# Patient Record
Sex: MALE | Race: WHITE
[De-identification: names, ages, dates, MRNs, and addresses within clinical notes are randomized per-mention and may not be internally consistent; named-entity substitution may affect disease eponyms.]

---

## 2023-03-27 ENCOUNTER — HOSPITAL ENCOUNTER (OUTPATIENT)
Dept: HOSPITAL 75 - RAD | Age: 68
End: 2023-03-27
Attending: OTOLARYNGOLOGY
Payer: MEDICARE

## 2023-03-27 VITALS — WEIGHT: 203.49 LBS | BODY MASS INDEX: 29.13 KG/M2 | HEIGHT: 70 IN

## 2023-03-27 DIAGNOSIS — E04.2: Primary | ICD-10-CM

## 2023-03-27 PROCEDURE — 10006 FNA BX W/US GDN EA ADDL: CPT

## 2023-03-27 NOTE — DIAGNOSTIC IMAGING REPORT
INDICATION: Left lobe thyroid nodules. Patient presents for

ultrasound-guided fine-needle aspiration.



Patient was brought to the procedure room and placed on table in

the supine position. Ultrasound imaging of the left neck was

performed to evaluate appropriate entry site. Left neck was then

prepped and draped in the usual sterile fashion. A small amount

of 1% lidocaine was utilized for local anesthesia. Four passes

were made into the dominant solid mass in the mid portion of the

left lobe of the thyroid utilizing 25-gauge needles and

fine-needle aspiration technique. Needles were removed, and

hemostasis was obtained. Patient tolerated the procedure well and

left the department in stable condition.



IMPRESSION: Successful ultrasound-guided fine needle aspiration

of the dominant solid mass with calcifications in the mid portion

of the left lobe of the thyroid. Pathology results are currently

pending.



Dictated by: 



  Dictated on workstation # [id number]

## 2023-03-27 NOTE — DIAGNOSTIC IMAGING REPORT
INDICATION: Left lobe thyroid nodules. Patient presents for

ultrasound-guided fine-needle aspiration.



FINDINGS: Patient was brought to the procedure room, placed on

table in the supine position. Ultrasound imaging of the left neck

was performed to evaluate appropriate entry site. Left neck was

then prepped and draped in the usual sterile fashion. A small

amount of 1% lidocaine was utilized for local anesthesia. 4

passes were made into the solid nodule in the upper pole of the

left lobe of the thyroid utilizing 25-gauge needles and

fine-needle aspiration technique. Needle was removed and

hemostasis was obtained. Patient tolerated the procedure well.



IMPRESSION: Successful ultrasound-guided fine-needle aspiration

of the dominant solid nodule in the upper pole of the left lobe

of the thyroid. Pathology results are currently pending.



Dictated by: 



  Dictated on workstation # [id number]